# Patient Record
Sex: MALE | Race: OTHER | URBAN - METROPOLITAN AREA
[De-identification: names, ages, dates, MRNs, and addresses within clinical notes are randomized per-mention and may not be internally consistent; named-entity substitution may affect disease eponyms.]

---

## 2017-05-22 PROBLEM — K21.9 GASTROESOPHAGEAL REFLUX DISEASE: Status: ACTIVE | Noted: 2017-05-22

## 2017-05-22 PROBLEM — F41.1 GENERALIZED ANXIETY DISORDER: Status: ACTIVE | Noted: 2017-05-22

## 2017-05-22 PROBLEM — Z71.6 ENCOUNTER FOR SMOKING CESSATION COUNSELING: Status: ACTIVE | Noted: 2017-05-22

## 2017-05-22 PROBLEM — F51.01 PRIMARY INSOMNIA: Status: ACTIVE | Noted: 2017-05-22

## 2017-05-22 NOTE — PATIENT INSTRUCTIONS
ASSESSMENT/PLAN:   Generalized anxiety disorder  (primary encounter diagnosis)- the symptoms seem to be due to anxiety with panic attacks, will start on trazodone which can help with the sleep issues also, if symptoms not better or worst let us know , also

## 2017-05-22 NOTE — PROGRESS NOTES
HPI:    Patient ID: Jennifer Paulino is a 34year old male. HPI  Pt comes in for the first time to establish care. Pt complaints of on going on and off symptoms of some dry mouth, sob, swallow difficulty, head feeling full with air.  Usually symptoms Allergies    HISTORY:  History reviewed. No pertinent past medical history.        Past Surgical History    APPENDECTOMY        Family History   Problem Relation Age of Onset   • Heart Attack Father    • Cancer Maternal Grandfather       Social History:   S like we discussed, no eating late at night, avoid spicy food,  Encounter for smoking cessation counseling- need to quit smoking let us k n ow if you need help   Bmi 32.0-32.9,adult- would help to loose some weight, watch diet, cut down on carbs, cut down o

## 2017-06-19 ENCOUNTER — PATIENT MESSAGE (OUTPATIENT)
Dept: INTERNAL MEDICINE CLINIC | Facility: CLINIC | Age: 32
End: 2017-06-19

## 2017-06-19 NOTE — TELEPHONE ENCOUNTER
From: Kyaw Little  To: Laverne Giraldo MD  Sent: 6/19/2017 8:25 AM CDT  Subject: Prescription Question    I was wondering if you can up my dosage of trazodone from 50 mg to 100 mg.  And also was wondering if you could prescribe me something to calm

## 2017-07-13 NOTE — PROGRESS NOTES
HPI:    Patient ID: Che Kirkland is a 34year old male. HPI  Comes in today for follow-up.   Patient has been having panic attacks and he had seen a doctor at his work with prescribed Lexapro and Xanax until Lexapro starts working takes Xanax once Packs/day: 0.00      Years: 0.00      Smokeless tobacco: Never Used                      Alcohol use:  Yes              Comment: weekends or socially       PHYSICAL EXAM:    Physical Exam   Constitutional: He

## 2017-07-15 NOTE — TELEPHONE ENCOUNTER
From: Brian Hwang  To: Marvell Soulier, MD  Sent: 7/14/2017 10:14 AM CDT  Subject: Other    Hello,     Yesterday you referred me to a psychiatrist Dr. Jacinto Le.  He currently isn't taking any patients until mid September, I would like to get i

## 2017-08-15 RX ORDER — TRAZODONE HYDROCHLORIDE 50 MG/1
TABLET ORAL
Qty: 30 TABLET | Refills: 0 | Status: SHIPPED | OUTPATIENT
Start: 2017-08-15 | End: 2017-09-11

## 2017-09-11 RX ORDER — TRAZODONE HYDROCHLORIDE 50 MG/1
TABLET ORAL
Qty: 30 TABLET | Refills: 1 | Status: SHIPPED | OUTPATIENT
Start: 2017-09-11 | End: 2017-11-09

## 2017-11-09 RX ORDER — TRAZODONE HYDROCHLORIDE 50 MG/1
TABLET ORAL
Qty: 30 TABLET | Refills: 1 | Status: SHIPPED | OUTPATIENT
Start: 2017-11-09 | End: 2018-01-18

## 2018-01-18 RX ORDER — TRAZODONE HYDROCHLORIDE 50 MG/1
TABLET ORAL
Qty: 30 TABLET | Refills: 0 | Status: SHIPPED | OUTPATIENT
Start: 2018-01-18

## 2023-02-02 ENCOUNTER — PATIENT OUTREACH (OUTPATIENT)
Dept: CASE MANAGEMENT | Age: 38
End: 2023-02-02

## 2023-02-02 NOTE — PROCEDURES
The office order for PCP request is Approved and finalized on February 2, 2023.     Thanks,  [facility] [employer]

## (undated) NOTE — MR AVS SNAPSHOT
[facility] [place]  [address] [address]  [phone]               Thank you for choosing us for your health care visit with Nando Carias MD.  We are glad to serve you and happy to provide you with this summary of your vi Urinalysis, Routine, specimen    Complete by: May 22, 2017    Assoc Dx:  Generalized anxiety disorder [F41.1], Gastroesophageal reflux disease, esophagitis presence not specified [K21.9], Encounter for smoking cessation counseling [Z71.6, Z72.0], BMI 32. These medications were sent to [facility], [address], [phone], [phone] [address] [facility], [address] [place] [address]     Phone:  [phone]    - TraZODone HCl 50 MG Tabs Start activities slowly and build up over time Do what you like   Get your heart pumping – brisk walking, biking, swimming Even 10 minute increments are effective and add up over the week   2 ½ hours per week – spread out over time Use a rosa to keep you